# Patient Record
Sex: FEMALE | Race: WHITE | NOT HISPANIC OR LATINO | Employment: OTHER | URBAN - NONMETROPOLITAN AREA
[De-identification: names, ages, dates, MRNs, and addresses within clinical notes are randomized per-mention and may not be internally consistent; named-entity substitution may affect disease eponyms.]

---

## 2018-01-24 ENCOUNTER — DOCUMENTATION ONLY (OUTPATIENT)
Dept: FAMILY MEDICINE | Facility: OTHER | Age: 58
End: 2018-01-24

## 2018-01-24 RX ORDER — DOXYCYCLINE HYCLATE 20 MG
20 TABLET ORAL 2 TIMES DAILY
COMMUNITY

## 2018-01-24 RX ORDER — HYDROCODONE BITARTRATE AND ACETAMINOPHEN 5; 325 MG/1; MG/1
1-2 TABLET ORAL EVERY 4 HOURS PRN
COMMUNITY
Start: 2013-05-29 | End: 2024-08-29

## 2018-01-24 RX ORDER — ERGOCALCIFEROL 1.25 MG/1
50000 CAPSULE, LIQUID FILLED ORAL WEEKLY
COMMUNITY
End: 2024-08-29

## 2018-01-24 RX ORDER — HYDROXYZINE PAMOATE 25 MG/1
1-2 CAPSULE ORAL EVERY 6 HOURS PRN
COMMUNITY
Start: 2013-05-29 | End: 2024-08-29

## 2018-01-24 RX ORDER — IBUPROFEN 200 MG
200 TABLET ORAL 4 TIMES DAILY PRN
COMMUNITY

## 2018-01-24 RX ORDER — TRIAMTERENE/HYDROCHLOROTHIAZID 37.5-25 MG
1 TABLET ORAL EVERY MORNING
COMMUNITY

## 2018-01-24 RX ORDER — TRAMADOL HYDROCHLORIDE 50 MG/1
TABLET ORAL
COMMUNITY
Start: 2016-07-03 | End: 2024-08-29

## 2021-09-30 ENCOUNTER — TRANSFERRED RECORDS (OUTPATIENT)
Dept: HEALTH INFORMATION MANAGEMENT | Facility: OTHER | Age: 61
End: 2021-09-30

## 2021-12-30 ENCOUNTER — ALLIED HEALTH/NURSE VISIT (OUTPATIENT)
Dept: FAMILY MEDICINE | Facility: OTHER | Age: 61
End: 2021-12-30
Attending: FAMILY MEDICINE
Payer: COMMERCIAL

## 2021-12-30 DIAGNOSIS — Z20.822 COVID-19 RULED OUT: Primary | ICD-10-CM

## 2021-12-30 PROCEDURE — U0005 INFEC AGEN DETEC AMPLI PROBE: HCPCS | Mod: ZL

## 2021-12-30 PROCEDURE — C9803 HOPD COVID-19 SPEC COLLECT: HCPCS

## 2021-12-30 NOTE — PROGRESS NOTES
Patient swabbed for COVID-19 testing.  Rule out. Patient has symptoms.  Mckenzie Corona MA on 12/30/2021 at 9:27 AM

## 2021-12-31 LAB — SARS-COV-2 RNA RESP QL NAA+PROBE: NEGATIVE

## 2022-02-19 ENCOUNTER — HEALTH MAINTENANCE LETTER (OUTPATIENT)
Age: 62
End: 2022-02-19

## 2022-10-22 ENCOUNTER — HEALTH MAINTENANCE LETTER (OUTPATIENT)
Age: 62
End: 2022-10-22

## 2023-04-01 ENCOUNTER — HEALTH MAINTENANCE LETTER (OUTPATIENT)
Age: 63
End: 2023-04-01

## 2024-03-23 ENCOUNTER — HEALTH MAINTENANCE LETTER (OUTPATIENT)
Age: 64
End: 2024-03-23

## 2024-06-01 ENCOUNTER — HEALTH MAINTENANCE LETTER (OUTPATIENT)
Age: 64
End: 2024-06-01

## 2024-08-29 ENCOUNTER — OFFICE VISIT (OUTPATIENT)
Dept: FAMILY MEDICINE | Facility: OTHER | Age: 64
End: 2024-08-29
Attending: PHYSICIAN ASSISTANT
Payer: COMMERCIAL

## 2024-08-29 VITALS
HEART RATE: 79 BPM | SYSTOLIC BLOOD PRESSURE: 132 MMHG | OXYGEN SATURATION: 96 % | TEMPERATURE: 97.6 F | DIASTOLIC BLOOD PRESSURE: 74 MMHG | BODY MASS INDEX: 26.4 KG/M2 | WEIGHT: 189.2 LBS

## 2024-08-29 DIAGNOSIS — A09 TRAVELER'S DIARRHEA: Primary | ICD-10-CM

## 2024-08-29 PROBLEM — I83.891 VARICOSE VEINS OF RIGHT LEG WITH EDEMA: Status: ACTIVE | Noted: 2023-09-26

## 2024-08-29 PROBLEM — L50.9 URTICARIA: Status: ACTIVE | Noted: 2018-02-01

## 2024-08-29 PROBLEM — T78.3XXA ANGIO-EDEMA: Status: ACTIVE | Noted: 2018-02-01

## 2024-08-29 PROCEDURE — 99213 OFFICE O/P EST LOW 20 MIN: CPT | Performed by: PHYSICIAN ASSISTANT

## 2024-08-29 RX ORDER — LORATADINE 10 MG/1
10 TABLET ORAL DAILY
COMMUNITY

## 2024-08-29 RX ORDER — CIPROFLOXACIN 500 MG/1
500 TABLET, FILM COATED ORAL 2 TIMES DAILY
Qty: 6 TABLET | Refills: 0 | Status: SHIPPED | OUTPATIENT
Start: 2024-08-29 | End: 2024-09-01

## 2024-08-29 NOTE — PATIENT INSTRUCTIONS
Try small amounts of food and drink frequently. Offer a bland diet. Advance as tolerated. Avoid dairy products the first day. You may add yogurt tomorrow as the first dairy product.    Sent ciprofloxacin tentatively to the pharmacy for travelers diarrhea. Do not take if your stool testing shows a different treatment recommended.     Try the BRAT diet (bread, bananas, rice, applesauce, tea, toast).  This is a very bland diet which should not irritate your colon.  Hold off on spicy foods, red sauces, mexican or chinese food.    Call or return to clinic as needed if your symptoms worsen or fail to improve as anticipated.     If the pain does not begin improving, localizes to the right lower belly, there is increased fever, or other progression of symptoms, return for reassessment.    Should I see a doctor or nurse about my stomach ache? -- Most people do not need to see a doctor or nurse for a stomach ache. But you should see your doctor or nurse if:  ?You have bloody bowel movements, diarrhea, or vomiting  ?Your pain is severe and lasts more than an hour or comes and goes for more than 24 hours  ?You cannot eat or drink for hours  ?You have a fever higher than 102 F (39 C)  ?You lose a lot of weight without trying to, or lose interest in food

## 2024-08-29 NOTE — NURSING NOTE
"Chief Complaint   Patient presents with    Gastrointestinal Problem     Patient has been having very water stools since 8/1/2024. She did start on a camping trip around [facility] 7/31/2024.   Initial /74   Pulse 79   Temp 97.6  F (36.4  C) (Tympanic)   Wt 85.8 kg (189 lb 3.2 oz)   SpO2 96%   BMI 26.40 kg/m   Estimated body mass index is 26.4 kg/m  as calculated from the following:    Height as of 7/3/16: 1.803 m (5' 10.98\").    Weight as of this encounter: 85.8 kg (189 lb 3.2 oz).  Medication Review: complete    The next two questions are to help us understand your food security.  If you are feeling you need any assistance in this area, we have resources available to support you today.          8/29/2024   SDOH- Food Insecurity   Within the past 12 months, did you worry that your food would run out before you got money to buy more? N   Within the past 12 months, did the food you bought just not last and you didn t have money to get more? N            Health Care Directive:  Patient does not have a Health Care Directive or Living Will: Discussed advance care planning with patient; however, patient declined at this time.    Cherelle Hills MA      "

## 2024-08-29 NOTE — PROGRESS NOTES
Assessment & Plan   Problem List Items Addressed This Visit    None  Visit Diagnoses       Traveler's diarrhea    -  Primary    Relevant Medications    ciprofloxacin (CIPRO) 500 MG tablet    Other Relevant Orders    Enteric Bacteria and Virus Panel PCR    Helicobacter pylori Antigen Stool    C. difficile Toxin B PCR with reflex to C. difficile Antigen and Toxins A/B EIA        Discussed symptoms and concerns at length.  Gave patient education.  Completed stool testing orders.  Patient was given a tentative prescription for ciprofloxacin 500 mg twice daily for 3 days to use for traveler's diarrhea if her stool testing is negative.  Continue to use Imodium as needed.  Gave warning signs and symptoms.  Encouraged brat diet.    Try small amounts of food and drink frequently. Offer a bland diet. Advance as tolerated. Avoid dairy products the first day. You may add yogurt tomorrow as the first dairy product.    Try the BRAT diet (bread, bananas, rice, applesauce, tea, toast).  This is a very bland diet which should not irritate your colon.  Hold off on spicy foods, red sauces, mexican or chinese food.    Call or return to clinic as needed if your symptoms worsen or fail to improve as anticipated.     If the pain does not begin improving, localizes to the right lower belly, there is increased fever, or other progression of symptoms, return for reassessment.    Should I see a doctor or nurse about my stomach ache? -- Most people do not need to see a doctor or nurse for a stomach ache. But you should see your doctor or nurse if:  ?You have bloody bowel movements, diarrhea, or vomiting  ?Your pain is severe and lasts more than an hour or comes and goes for more than 24 hours  ?You cannot eat or drink for hours  ?You have a fever higher than 102 F (39 C)  ?You lose a lot of weight without trying to, or lose interest in food        See Patient Instructions    No follow-ups on file.      Sirisha Renee is a 64 year old,  presenting for the following health issues:  Gastrointestinal Problem        8/29/2024     9:52 AM   Additional Questions   Roomed by Cherelle ORTIZ CMA     History of Present Illness       Reason for visit:  Frequent diahrea  Symptom onset:  3-4 weeks ago   She is taking medications regularly.     Diarrhea  Onset/Duration: 8/1/2024  Description:       Consistency of stool: watery and green       Blood in stool: No       Number of loose stools past 24 hours: 5  Progression of Symptoms: constant  Accompanying signs and symptoms:       Fever: No       Nausea/Vomiting: No       Abdominal pain: No, bloating though       Weight loss: No       Episodes of constipation: No  History   Ill contacts: No  Recent use of antibiotics: patient is on low dose of antibiotic, but has been for years  Recent travels: YES- camping trip around [facility]  Recent medication-new or changes(Rx or OTC): No  Precipitating or alleviating factors: None  Therapies tried and outcome: Imodium AD    Patient is coming in today with persistent diarrhea.  When traveling with camping, hotels, and hiking on 7/31 for 2 or around the [place].  Ended up a persistent diarrheal episodes around August seconds.  Diarrhea will flare in the evening, night, or after eating.  Tried the brat diet.  Not helping.  Eating normal foods.  No new foods or medications.  Did not drink perk water.  Using Imodium which helps mildly.  Still taking.  No blood in the stool, black tarry stools, fevers, chills, nausea, vomiting, cough or cold symptoms, abdominal pain.      Review of Systems  Constitutional, HEENT, cardiovascular, pulmonary, gi and gu systems are negative, except as otherwise noted.      Objective    /74   Pulse 79   Temp 97.6  F (36.4  C) (Tympanic)   Wt 85.8 kg (189 lb 3.2 oz)   SpO2 96%   BMI 26.40 kg/m    Body mass index is 26.4 kg/m .  Physical Exam  Vitals and nursing note reviewed.   Constitutional:       General: She is not in acute distress.      Appearance: Normal appearance. She is well-developed.   Cardiovascular:      Rate and Rhythm: Normal rate and regular rhythm.      Heart sounds: Normal heart sounds, S1 normal and S2 normal. No murmur heard.  Pulmonary:      Effort: Pulmonary effort is normal. No respiratory distress.      Breath sounds: Normal breath sounds. No wheezing or rales.   Abdominal:      General: Abdomen is flat. Bowel sounds are normal.      Palpations: Abdomen is soft. There is no mass.      Tenderness: There is no abdominal tenderness. There is no right CVA tenderness, left CVA tenderness, guarding or rebound.      Hernia: No hernia is present.   Musculoskeletal:         General: Normal range of motion.   Skin:     General: Skin is warm and dry.      Findings: No rash.   Neurological:      General: No focal deficit present.      Mental Status: She is alert and oriented to person, place, and time.   Psychiatric:         Mood and Affect: Mood normal.         Behavior: Behavior normal.         Thought Content: Thought content normal.         Judgment: Judgment normal.                Signed Electronically by: Lillie Melgar PA-C

## 2024-08-30 ENCOUNTER — LAB (OUTPATIENT)
Dept: LAB | Facility: OTHER | Age: 64
End: 2024-08-30
Attending: PHYSICIAN ASSISTANT
Payer: COMMERCIAL

## 2024-08-30 DIAGNOSIS — A09 TRAVELER'S DIARRHEA: ICD-10-CM

## 2024-08-30 LAB
ADV 40+41 DNA STL QL NAA+NON-PROBE: NEGATIVE
ASTRO TYP 1-8 RNA STL QL NAA+NON-PROBE: NEGATIVE
C CAYETANENSIS DNA STL QL NAA+NON-PROBE: NEGATIVE
C DIFF TOX B STL QL: NEGATIVE
CAMPYLOBACTER DNA SPEC NAA+PROBE: NEGATIVE
CRYPTOSP DNA STL QL NAA+NON-PROBE: NEGATIVE
E COLI O157 DNA STL QL NAA+NON-PROBE: ABNORMAL
E HISTOLYT DNA STL QL NAA+NON-PROBE: NEGATIVE
EAEC ASTA GENE ISLT QL NAA+PROBE: NEGATIVE
EC STX1+STX2 GENES STL QL NAA+NON-PROBE: NEGATIVE
EPEC EAE GENE STL QL NAA+NON-PROBE: NEGATIVE
ETEC LTA+ST1A+ST1B TOX ST NAA+NON-PROBE: NEGATIVE
G LAMBLIA DNA STL QL NAA+NON-PROBE: NEGATIVE
H PYLORI AG STL QL IA: NEGATIVE
NOROVIRUS GI+II RNA STL QL NAA+NON-PROBE: POSITIVE
P SHIGELLOIDES DNA STL QL NAA+NON-PROBE: NEGATIVE
RVA RNA STL QL NAA+NON-PROBE: NEGATIVE
SALMONELLA SP RPOD STL QL NAA+PROBE: NEGATIVE
SAPO I+II+IV+V RNA STL QL NAA+NON-PROBE: NEGATIVE
SHIGELLA SP+EIEC IPAH ST NAA+NON-PROBE: NEGATIVE
V CHOLERAE DNA SPEC QL NAA+PROBE: NEGATIVE
VIBRIO DNA SPEC NAA+PROBE: NEGATIVE
Y ENTEROCOL DNA STL QL NAA+PROBE: NEGATIVE

## 2024-08-30 PROCEDURE — 87338 HPYLORI STOOL AG IA: CPT | Mod: ZL

## 2024-08-30 PROCEDURE — 87507 IADNA-DNA/RNA PROBE TQ 12-25: CPT | Mod: ZL

## 2024-08-30 PROCEDURE — 87493 C DIFF AMPLIFIED PROBE: CPT | Mod: ZL

## 2024-10-31 SDOH — HEALTH STABILITY: PHYSICAL HEALTH: ON AVERAGE, HOW MANY DAYS PER WEEK DO YOU ENGAGE IN MODERATE TO STRENUOUS EXERCISE (LIKE A BRISK WALK)?: 6 DAYS

## 2024-10-31 SDOH — HEALTH STABILITY: PHYSICAL HEALTH: ON AVERAGE, HOW MANY MINUTES DO YOU ENGAGE IN EXERCISE AT THIS LEVEL?: 60 MIN

## 2024-10-31 ASSESSMENT — SOCIAL DETERMINANTS OF HEALTH (SDOH): HOW OFTEN DO YOU GET TOGETHER WITH FRIENDS OR RELATIVES?: PATIENT DECLINED

## 2024-11-05 ENCOUNTER — OFFICE VISIT (OUTPATIENT)
Dept: FAMILY MEDICINE | Facility: OTHER | Age: 64
End: 2024-11-05
Attending: PHYSICIAN ASSISTANT
Payer: COMMERCIAL

## 2024-11-05 VITALS
DIASTOLIC BLOOD PRESSURE: 72 MMHG | WEIGHT: 190.6 LBS | OXYGEN SATURATION: 98 % | TEMPERATURE: 97.6 F | SYSTOLIC BLOOD PRESSURE: 125 MMHG | BODY MASS INDEX: 27.29 KG/M2 | HEIGHT: 70 IN | HEART RATE: 61 BPM

## 2024-11-05 DIAGNOSIS — R73.03 PREDIABETES: ICD-10-CM

## 2024-11-05 DIAGNOSIS — Z11.4 SCREENING FOR HIV (HUMAN IMMUNODEFICIENCY VIRUS): ICD-10-CM

## 2024-11-05 DIAGNOSIS — I10 ESSENTIAL HYPERTENSION: ICD-10-CM

## 2024-11-05 DIAGNOSIS — Z85.828 HISTORY OF BASAL CELL CARCINOMA: ICD-10-CM

## 2024-11-05 DIAGNOSIS — Z13.220 SCREENING CHOLESTEROL LEVEL: ICD-10-CM

## 2024-11-05 DIAGNOSIS — Z13.820 SCREENING FOR OSTEOPOROSIS: ICD-10-CM

## 2024-11-05 DIAGNOSIS — Z23 NEED FOR COVID-19 VACCINE: ICD-10-CM

## 2024-11-05 DIAGNOSIS — Z23 NEED FOR INFLUENZA VACCINATION: ICD-10-CM

## 2024-11-05 DIAGNOSIS — Z00.00 ROUTINE GENERAL MEDICAL EXAMINATION AT A HEALTH CARE FACILITY: Primary | ICD-10-CM

## 2024-11-05 LAB
ANION GAP SERPL CALCULATED.3IONS-SCNC: 9 MMOL/L (ref 7–15)
BUN SERPL-MCNC: 13.4 MG/DL (ref 8–23)
CALCIUM SERPL-MCNC: 9.7 MG/DL (ref 8.8–10.4)
CHLORIDE SERPL-SCNC: 103 MMOL/L (ref 98–107)
CHOLEST SERPL-MCNC: 225 MG/DL
CREAT SERPL-MCNC: 0.88 MG/DL (ref 0.51–0.95)
EGFRCR SERPLBLD CKD-EPI 2021: 73 ML/MIN/1.73M2
EST. AVERAGE GLUCOSE BLD GHB EST-MCNC: 117 MG/DL
FASTING STATUS PATIENT QL REPORTED: YES
FASTING STATUS PATIENT QL REPORTED: YES
GLUCOSE SERPL-MCNC: 100 MG/DL (ref 70–99)
HBA1C MFR BLD: 5.7 %
HCO3 SERPL-SCNC: 29 MMOL/L (ref 22–29)
HDLC SERPL-MCNC: 77 MG/DL
LDLC SERPL CALC-MCNC: 117 MG/DL
NONHDLC SERPL-MCNC: 148 MG/DL
POTASSIUM SERPL-SCNC: 4.2 MMOL/L (ref 3.4–5.3)
SODIUM SERPL-SCNC: 141 MMOL/L (ref 135–145)
TRIGL SERPL-MCNC: 156 MG/DL

## 2024-11-05 PROCEDURE — 90673 RIV3 VACCINE NO PRESERV IM: CPT | Performed by: PHYSICIAN ASSISTANT

## 2024-11-05 PROCEDURE — 83036 HEMOGLOBIN GLYCOSYLATED A1C: CPT | Mod: ZL | Performed by: PHYSICIAN ASSISTANT

## 2024-11-05 PROCEDURE — 82465 ASSAY BLD/SERUM CHOLESTEROL: CPT | Mod: ZL | Performed by: PHYSICIAN ASSISTANT

## 2024-11-05 PROCEDURE — 90471 IMMUNIZATION ADMIN: CPT | Performed by: PHYSICIAN ASSISTANT

## 2024-11-05 PROCEDURE — 36415 COLL VENOUS BLD VENIPUNCTURE: CPT | Mod: ZL | Performed by: PHYSICIAN ASSISTANT

## 2024-11-05 PROCEDURE — 90480 ADMN SARSCOV2 VAC 1/ONLY CMP: CPT | Performed by: PHYSICIAN ASSISTANT

## 2024-11-05 PROCEDURE — 87389 HIV-1 AG W/HIV-1&-2 AB AG IA: CPT | Mod: ZL | Performed by: PHYSICIAN ASSISTANT

## 2024-11-05 PROCEDURE — 80048 BASIC METABOLIC PNL TOTAL CA: CPT | Mod: ZL | Performed by: PHYSICIAN ASSISTANT

## 2024-11-05 PROCEDURE — 91320 SARSCV2 VAC 30MCG TRS-SUC IM: CPT | Performed by: PHYSICIAN ASSISTANT

## 2024-11-05 PROCEDURE — 99396 PREV VISIT EST AGE 40-64: CPT | Mod: 25 | Performed by: PHYSICIAN ASSISTANT

## 2024-11-05 RX ORDER — TRIAMTERENE AND HYDROCHLOROTHIAZIDE 37.5; 25 MG/1; MG/1
0.5 TABLET ORAL EVERY MORNING
Qty: 90 TABLET | Refills: 3 | Status: SHIPPED | OUTPATIENT
Start: 2024-11-05

## 2024-11-05 NOTE — PATIENT INSTRUCTIONS
Patient Education   Preventive Care Advice   This is general advice given by our system to help you stay healthy. However, your care team may have specific advice just for you. Please talk to your care team about your preventive care needs.  Nutrition  Eat 5 or more servings of fruits and vegetables each day.  Try wheat bread, brown rice and whole grain pasta (instead of white bread, rice, and pasta).  Get enough calcium and vitamin D. Check the label on foods and aim for 100% of the RDA (recommended daily allowance).  Lifestyle  Exercise at least 150 minutes each week  (30 minutes a day, 5 days a week).  Do muscle strengthening activities 2 days a week. These help control your weight and prevent disease.  No smoking.  Wear sunscreen to prevent skin cancer.  Have a dental exam and cleaning every 6 months.  Yearly exams  See your health care team every year to talk about:  Any changes in your health.  Any medicines your care team has prescribed.  Preventive care, family planning, and ways to prevent chronic diseases.  Shots (vaccines)   HPV shots (up to age 26), if you've never had them before.  Hepatitis B shots (up to age 59), if you've never had them before.  COVID-19 shot: Get this shot when it's due.  Flu shot: Get a flu shot every year.  Tetanus shot: Get a tetanus shot every 10 years.  Pneumococcal, hepatitis A, and RSV shots: Ask your care team if you need these based on your risk.  Shingles shot (for age 50 and up)  General health tests  Diabetes screening:  Starting at age 35, Get screened for diabetes at least every 3 years.  If you are younger than age 35, ask your care team if you should be screened for diabetes.  Cholesterol test: At age 39, start having a cholesterol test every 5 years, or more often if advised.  Bone density scan (DEXA): At age 50, ask your care team if you should have this scan for osteoporosis (brittle bones).  Hepatitis C: Get tested at least once in your life.  STIs (sexually  transmitted infections)  Before age 24: Ask your care team if you should be screened for STIs.  After age 24: Get screened for STIs if you're at risk. You are at risk for STIs (including HIV) if:  You are sexually active with more than one person.  You don't use condoms every time.  You or a partner was diagnosed with a sexually transmitted infection.  If you are at risk for HIV, ask about PrEP medicine to prevent HIV.  Get tested for HIV at least once in your life, whether you are at risk for HIV or not.  Cancer screening tests  Cervical cancer screening: If you have a cervix, begin getting regular cervical cancer screening tests starting at age 21.  Breast cancer scan (mammogram): If you've ever had breasts, begin having regular mammograms starting at age 40. This is a scan to check for breast cancer.  Colon cancer screening: It is important to start screening for colon cancer at age 45.  Have a colonoscopy test every 10 years (or more often if you're at risk) Or, ask your provider about stool tests like a FIT test every year or Cologuard test every 3 years.  To learn more about your testing options, visit:   .  For help making a decision, visit:   https://bit.ly/sr23446.  Prostate cancer screening test: If you have a prostate, ask your care team if a prostate cancer screening test (PSA) at age 55 is right for you.  Lung cancer screening: If you are a current or former smoker ages 50 to 80, ask your care team if ongoing lung cancer screenings are right for you.  For informational purposes only. Not to replace the advice of your health care provider. Copyright   2023 [facility]. All rights reserved. Clinically reviewed by the [facility] Transitions Program. [employer] 092316 - REV 01/24.  Eating Healthy Foods: Care Instructions  With every meal, you can make healthy food choices. Try to eat a variety of fruits, vegetables, whole grains, lean proteins, and low-fat dairy products. This can help  "you get the right balance of nutrients, including vitamins and minerals. Small changes add up over time. You can start by adding one healthy food to your meals each day.    Try to make half your plate fruits and vegetables, one-fourth whole grains, and one-fourth lean proteins. Try including dairy with your meals.   Eat more fruits and vegetables. Try to have them with most meals and snacks.   Foods for healthy eating        Fruits   These can be fresh, frozen, canned, or dried.  Try to choose whole fruit rather than fruit juice.  Eat a variety of colors.        Vegetables   These can be fresh, frozen, canned, or dried.  Beans, peas, and lentils count too.        Whole grains   Choose whole-grain breads, cereals, and noodles.  Try brown rice.        Lean proteins   These can include lean meat, poultry, fish, and eggs.  You can also have tofu, beans, peas, lentils, nuts, and seeds.        Dairy   Try milk, yogurt, and cheese.  Choose low-fat or fat-free when you can.  If you need to, use lactose-free milk or fortified plant-based milk products, such as soy milk.        Water   Drink water when you're thirsty.  Limit sugar-sweetened drinks, including soda, fruit drinks, and sports drinks.  Where can you learn more?  Go to https://www.[employer].net/patiented  Enter T756 in the search box to learn more about \"Eating Healthy Foods: Care Instructions.\"  Current as of: September 20, 2023  Content Version: 14.2 2024 [facility] [employer].   Care instructions adapted under license by your healthcare professional. If you have questions about a medical condition or this instruction, always ask your healthcare professional. [employer] disclaims any warranty or liability for your use of this information.       "

## 2024-11-05 NOTE — NURSING NOTE
"Chief Complaint   Patient presents with    Physical     Patient here for annual. She would like flu and covid vaccines today and she is fasting.  Initial /72   Pulse 61   Temp 97.6  F (36.4  C) (Tympanic)   Ht 1.778 m (5' 10\")   Wt 86.5 kg (190 lb 9.6 oz)   SpO2 98%   BMI 27.35 kg/m   Estimated body mass index is 27.35 kg/m  as calculated from the following:    Height as of this encounter: 1.778 m (5' 10\").    Weight as of this encounter: 86.5 kg (190 lb 9.6 oz).  Medication Review: complete    The next two questions are to help us understand your food security.  If you are feeling you need any assistance in this area, we have resources available to support you today.          10/31/2024   SDOH- Food Insecurity   Within the past 12 months, did you worry that your food would run out before you got money to buy more? N    Within the past 12 months, did the food you bought just not last and you didn t have money to get more? N        Patient-reported         Health Care Directive:  Patient does not have a Health Care Directive: Discussed advance care planning with patient; however, patient declined at this time.    Cherelle Hills MA      "

## 2024-11-05 NOTE — PROGRESS NOTES
"Preventive Care Visit  [facility]  Lillie Melgar PA-C, Family Medicine  Nov 5, 2024      Assessment & Plan   Problem List Items Addressed This Visit          Circulatory    Essential hypertension    Relevant Medications    triamterene-HCTZ (MAXZIDE-25) 37.5-25 MG tablet    Other Relevant Orders    Basic Metabolic Panel       Musculoskeletal and Integumentary    History of basal cell carcinoma     Other Visit Diagnoses       Routine general medical examination at a health care facility    -  Primary    Need for COVID-19 vaccine        Relevant Orders    COVID-19 12+ (PFIZER) (Completed)    Need for influenza vaccination        Relevant Orders    INFLUENZA VACCINE TRIVALENT(FLUBLOK) (Completed)    Screening cholesterol level        Relevant Orders    Lipid Panel    Prediabetes        Relevant Orders    Hemoglobin A1c    Screening for osteoporosis        Relevant Orders    DX Bone Density    Screening for HIV (human immunodeficiency virus)        Relevant Orders    HIV Antigen Antibody Combo           Completed low risk HIV screen.  Completed cholesterol screening along with prediabetes monitoring.  Encouraged good diet, exercise, and low-carb and sugar diet.    Patient was given a COVID-19 and flu vaccine.    History of basal cell carcinoma and acne rosacea: Continue to follow with dermatology.    Hypertension: Refill medication.  Completed BMP for monitoring.  Encouraged good diet and exercise.  Recheck in 1 year for monitoring.    Repeat physical in 1 year.    Patient has been advised of split billing requirements and indicates understanding: Yes       BMI  Estimated body mass index is 27.35 kg/m  as calculated from the following:    Height as of this encounter: 1.778 m (5' 10\").    Weight as of this encounter: 86.5 kg (190 lb 9.6 oz).       Counseling  Appropriate preventive services were addressed with this patient via screening, questionnaire, or discussion as appropriate for fall " prevention, nutrition, physical activity, Tobacco-use cessation, social engagement, weight loss and cognition.  Checklist reviewing preventive services available has been given to the patient.  Reviewed patient's diet, addressing concerns and/or questions.     See Patient Instructions    Return in about 53 weeks (around 2025) for Annual Wellness Visit.      Sirisha Renee is a 64 year old, presenting for the following:  Physical        2024     8:16 AM   Additional Questions   Roomed by Cherelle ORTIZ CMA          HPI    No LMP recorded. Patient is postmenopausal.   Contraception: postmenopausal  Risk for STI?: none  Last pap: 2022 - negative pap and HPV, no further needed  Any hx of abnormal paps:  no  FH of early CA?: breast cancer  Cholesterol/DM concerns/screenin, repeated  Tobacco?: no  Lung cancer screening: na  Calcium intake: calcium supplement  DEXA: none  Last mammo: 2023, has repeat scheduled 2024  Colonoscopy: 2021 - colonoscopy, repeat in 7 years  Hepatitis C screen: 2019   HIV screen: none, ordered  Immunizations: COVID-19 and flu    Patient has history of acne rosacea and basal cell carcinoma.  Sees dermatology every 6 months for monitoring.  Has an upcoming appointment in February.  Currently taking doxycycline 20 mg twice daily for her acne rosacea.  Doing well.    Hypertension: Patient is currently doing well with her medication.  Tolerates medication well.  No chest pain, palpitations, problems breathing, GI or urinary symptoms.  Currently taking half a tablet.      Health Care Directive  Patient does not have a Health Care Directive: Discussed advance care planning with patient; however, patient declined at this time.      10/31/2024   General Health   How would you rate your overall physical health? Good   Feel stress (tense, anxious, or unable to sleep) Not at all            10/31/2024   Nutrition   Three or more servings of calcium each day? Yes   Diet:  Regular (no restrictions)   How many servings of fruit and vegetables per day? (!) 2-3   How many sweetened beverages each day? 0-1            10/31/2024   Exercise   Days per week of moderate/strenous exercise 6 days   Average minutes spent exercising at this level 60 min            10/31/2024   Social Factors   Frequency of gathering with friends or relatives Patient declined   Worry food won't last until get money to buy more No   Food not last or not have enough money for food? No   Do you have housing? (Housing is defined as stable permanent housing and does not include staying ouside in a car, in a tent, in an abandoned building, in an overnight shelter, or couch-surfing.) Yes   Are you worried about losing your housing? No   Lack of transportation? No   Unable to get utilities (heat,electricity)? No            10/31/2024   Fall Risk   Fallen 2 or more times in the past year? No    Trouble with walking or balance? No        Patient-reported          10/31/2024   Dental   Dentist two times every year? Yes            10/31/2024   TB Screening   Were you born outside of the [place]? No              Today's PHQ-2 Score:       8/29/2024     9:46 AM   PHQ-2 ( 1999 [employer])   Q1: Little interest or pleasure in doing things 0    Q2: Feeling down, depressed or hopeless 0    PHQ-2 Score 0   Q1: Little interest or pleasure in doing things Not at all   Q2: Feeling down, depressed or hopeless Not at all   PHQ-2 Score 0       Patient-reported         10/31/2024   Substance Use   Alcohol more than 3/day or more than 7/wk No   Do you use any other substances recreationally? No        Social History     Tobacco Use    Smoking status: Never   Substance Use Topics    Alcohol use: Yes     Comment: Alcoholic Drinks/day: couple beers a week    Drug use: Never          Mammogram Screening - Mammogram every 1-2 years updated in Health Maintenance based on mutual decision making          10/31/2024   One time HIV Screening   Previous HIV  test? No          10/31/2024   STI Screening   New sexual partner(s) since last STI/HIV test? No        History of abnormal Pap smear: No - age 30-64 HPV with reflex Pap every 5 years recommended       ASCVD Risk   The ASCVD Risk score (David STARR, et al., 2019) failed to calculate for the following reasons:    Cannot find a previous HDL lab    Cannot find a previous total cholesterol lab           Reviewed and updated as needed this visit by Provider   Tobacco  Allergies  Meds  Problems  Med Hx  Surg Hx  Fam Hx            Past Medical History:   Diagnosis Date    Closed fracture of patella     No Comments Provided    Essential (primary) hypertension     No Comments Provided    Hemorrhoids     No Comments Provided     Past Surgical History:   Procedure Laterality Date     SECTION          OTHER SURGICAL HISTORY      ,OTHER    OTHER SURGICAL HISTORY      13,,OTHER,OPEN REDUCTION INTERNAL FIXATION RIGHT PATELLAR FRACTURE    OTHER SURGICAL HISTORY      ,OTHER,REVISION OPEN REDUCTION INTERNAL FIXATION  RIGHT INFERIOR POLE PATELLA FRACTURE    PATELLA SURGERY Right     x3    TONSILLECTOMY      No Comments Provided     OB History   No obstetric history on file.     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    History of basal cell carcinoma     Past Surgical History:   Procedure Laterality Date     SECTION          OTHER SURGICAL HISTORY      ,OTHER    OTHER SURGICAL HISTORY       "[id number],OTHER,OPEN REDUCTION INTERNAL FIXATION RIGHT PATELLAR FRACTURE    OTHER SURGICAL HISTORY      [id number],OTHER,REVISION OPEN REDUCTION INTERNAL FIXATION  RIGHT INFERIOR POLE PATELLA FRACTURE    PATELLA SURGERY Right     x3    TONSILLECTOMY      No Comments Provided       Social History     Tobacco Use    Smoking status: Never    Smokeless tobacco: Not on file   Substance Use Topics    Alcohol use: Yes     Comment: Alcoholic Drinks/day: couple beers a week     Family History   Problem Relation Age of Onset    Diabetes Mother     Pacemaker Mother         watchman    Heart Disease Father          at 48yo of [place]    No Known Problems Sister     No Known Problems Sister     No Known Problems Sister     No Known Problems Brother     Breast Cancer Daughter          Current Outpatient Medications   Medication Sig Dispense Refill    B COMPLEX-C IJ       doxycycline (PERIOSTAT) 20 MG tablet Take 20 mg by mouth 2 times daily      loratadine (CLARITIN) 10 MG tablet Take 10 mg by mouth daily.      triamterene-HCTZ (MAXZIDE-25) 37.5-25 MG tablet Take 0.5 tablets by mouth every morning. 90 tablet 3     Allergies   Allergen Reactions    Amoxicillin Hives     No lab results found.       Review of Systems  Constitutional, neuro, ENT, endocrine, pulmonary, cardiac, gastrointestinal, genitourinary, musculoskeletal, integument and psychiatric systems are negative, except as otherwise noted.     Objective    Exam  /72   Pulse 61   Temp 97.6  F (36.4  C) (Tympanic)   Ht 1.778 m (5' 10\")   Wt 86.5 kg (190 lb 9.6 oz)   SpO2 98%   BMI 27.35 kg/m     Estimated body mass index is 27.35 kg/m  as calculated from the following:    Height as of this encounter: 1.778 m (5' 10\").    Weight as of this encounter: 86.5 kg (190 lb 9.6 oz).    Physical Exam  GENERAL: alert and no distress  EYES: Eyes grossly normal to inspection, PERRL and conjunctivae and sclerae normal  HENT: ear canals and TM's normal, nose and " mouth without ulcers or lesions  NECK: no adenopathy, no asymmetry, masses, or scars  RESP: lungs clear to auscultation - no rales, rhonchi or wheezes  CV: regular rate and rhythm, normal S1 S2, no S3 or S4, no murmur, click or rub, no peripheral edema  ABDOMEN: soft, nontender, no hepatosplenomegaly, no masses and bowel sounds normal  MS: no gross musculoskeletal defects noted, no edema  SKIN: no suspicious lesions or rashes  NEURO: Normal strength and tone, mentation intact and speech normal  PSYCH: mentation appears normal, affect normal/bright        Signed Electronically by: Lillie Melgar PA-C

## 2024-11-06 LAB — HIV 1+2 AB+HIV1 P24 AG SERPL QL IA: NONREACTIVE

## 2024-11-14 ENCOUNTER — HOSPITAL ENCOUNTER (OUTPATIENT)
Dept: MAMMOGRAPHY | Facility: OTHER | Age: 64
Discharge: HOME | End: 2024-11-14
Attending: PHYSICIAN ASSISTANT
Payer: COMMERCIAL

## 2024-11-14 DIAGNOSIS — Z12.31 VISIT FOR SCREENING MAMMOGRAM: ICD-10-CM

## 2024-11-14 PROCEDURE — 77063 BREAST TOMOSYNTHESIS BI: CPT

## 2024-12-10 ENCOUNTER — HOSPITAL ENCOUNTER (OUTPATIENT)
Dept: BONE DENSITY | Facility: OTHER | Age: 64
Discharge: HOME | End: 2024-12-10
Attending: PHYSICIAN ASSISTANT
Payer: COMMERCIAL

## 2024-12-10 DIAGNOSIS — Z13.820 SCREENING FOR OSTEOPOROSIS: ICD-10-CM

## 2024-12-10 PROCEDURE — 77080 DXA BONE DENSITY AXIAL: CPT
